# Patient Record
Sex: MALE | Race: WHITE | Employment: OTHER
[De-identification: names, ages, dates, MRNs, and addresses within clinical notes are randomized per-mention and may not be internally consistent; named-entity substitution may affect disease eponyms.]

---

## 2018-12-28 PROBLEM — R07.9 CHEST PAIN: Status: ACTIVE | Noted: 2018-12-28

## 2018-12-29 PROBLEM — I48.0 PAROXYSMAL ATRIAL FIBRILLATION (HCC): Status: ACTIVE | Noted: 2018-12-29

## 2018-12-29 PROBLEM — G47.30 SLEEP APNEA: Status: ACTIVE | Noted: 2018-12-29

## 2018-12-29 PROBLEM — G47.33 OSA (OBSTRUCTIVE SLEEP APNEA): Status: ACTIVE | Noted: 2018-12-29

## 2018-12-29 PROBLEM — I10 HYPERTENSION: Status: ACTIVE | Noted: 2018-12-29

## 2018-12-29 PROBLEM — F41.3 OTHER MIXED ANXIETY DISORDERS: Status: ACTIVE | Noted: 2018-12-29

## 2018-12-29 PROBLEM — I21.4 NSTEMI (NON-ST ELEVATED MYOCARDIAL INFARCTION) (HCC): Status: ACTIVE | Noted: 2018-12-29

## 2018-12-29 PROBLEM — I20.0 PREINFARCTION SYNDROME (HCC): Status: ACTIVE | Noted: 2018-12-29

## 2019-01-01 PROBLEM — I25.10 CORONARY ARTERY DISEASE INVOLVING NATIVE CORONARY ARTERY: Status: ACTIVE | Noted: 2019-01-01

## 2019-01-01 PROBLEM — Z98.61 POST PTCA: Status: ACTIVE | Noted: 2019-01-01

## 2019-01-11 PROBLEM — Z95.5 PRESENCE OF STENT IN CORONARY ARTERY: Status: ACTIVE | Noted: 2019-01-11

## 2019-02-27 PROBLEM — E78.49 OTHER HYPERLIPIDEMIA: Status: ACTIVE | Noted: 2019-02-27

## 2019-05-07 PROBLEM — I48.0 PAROXYSMAL ATRIAL FIBRILLATION (HCC): Status: RESOLVED | Noted: 2018-12-29 | Resolved: 2019-05-07

## 2019-05-07 PROBLEM — E87.5 HYPERKALEMIA: Status: ACTIVE | Noted: 2019-05-07

## 2019-05-07 PROBLEM — I48.19 PERSISTENT ATRIAL FIBRILLATION (HCC): Status: ACTIVE | Noted: 2019-05-07

## 2019-05-07 PROBLEM — Z95.5 S/P DRUG ELUTING CORONARY STENT PLACEMENT: Status: ACTIVE | Noted: 2019-05-07

## 2019-12-11 PROBLEM — I48.19 PERSISTENT ATRIAL FIBRILLATION (HCC): Status: RESOLVED | Noted: 2019-05-07 | Resolved: 2019-12-11

## 2020-01-08 PROBLEM — J18.9 PNEUMONIA: Status: ACTIVE | Noted: 2020-01-08

## 2020-02-12 PROBLEM — Z79.01 CURRENT USE OF LONG TERM ANTICOAGULATION: Status: ACTIVE | Noted: 2020-02-12

## 2022-02-08 PROBLEM — S76.112A TRAUMATIC RUPTURE OF LEFT QUADRICEPS TENDON: Status: ACTIVE | Noted: 2022-02-08

## 2022-10-10 ENCOUNTER — NURSE TRIAGE (OUTPATIENT)
Dept: OTHER | Facility: CLINIC | Age: 62
End: 2022-10-10

## 2022-10-10 NOTE — TELEPHONE ENCOUNTER
Subjective: Caller states \"I have a sore throat\"     Current Symptoms: Sore throat and cough. Coughed up red and brown phlegm. On Xeralto. Ribs are sore from coughing. No SOB. Onset: 1 month ago; unchanged    Temperature: possible low grade fever, no temp able to be taken     What has been tried: Mucinex    LMP: NA Pregnant: NA    Recommended disposition: See PCP within 24 Hours    Care advice provided, patient verbalizes understanding; denies any other questions or concerns; instructed to call back for any new or worsening symptoms. Patient/caller agrees to proceed to nearest [facility]     This triage is a result of a call to [address] [place] [address]. Please do not respond to the triage nurse through this encounter. Any subsequent communication should be directly with the patient.     Reason for Disposition   Coughing up jordan-colored (reddish-brown) sputum    Protocols used: Cough - Acute Productive-ADULT-AH